# Patient Record
Sex: MALE | Race: AMERICAN INDIAN OR ALASKA NATIVE | Employment: OTHER | URBAN - METROPOLITAN AREA
[De-identification: names, ages, dates, MRNs, and addresses within clinical notes are randomized per-mention and may not be internally consistent; named-entity substitution may affect disease eponyms.]

---

## 2018-03-27 ENCOUNTER — OFFICE VISIT (OUTPATIENT)
Dept: GERIATRIC MEDICINE | Age: 83
End: 2018-03-27
Payer: MEDICARE

## 2018-03-27 VITALS
TEMPERATURE: 97.6 F | HEIGHT: 74 IN | WEIGHT: 195.1 LBS | BODY MASS INDEX: 25.04 KG/M2 | DIASTOLIC BLOOD PRESSURE: 74 MMHG | HEART RATE: 64 BPM | SYSTOLIC BLOOD PRESSURE: 130 MMHG

## 2018-03-27 DIAGNOSIS — F02.80 LATE ONSET ALZHEIMER'S DISEASE WITHOUT BEHAVIORAL DISTURBANCE (HCC): Primary | ICD-10-CM

## 2018-03-27 DIAGNOSIS — G30.1 LATE ONSET ALZHEIMER'S DISEASE WITHOUT BEHAVIORAL DISTURBANCE (HCC): Primary | ICD-10-CM

## 2018-03-27 PROCEDURE — 99211 OFF/OP EST MAY X REQ PHY/QHP: CPT

## 2018-03-27 PROCEDURE — 99212 OFFICE O/P EST SF 10 MIN: CPT | Performed by: INTERNAL MEDICINE

## 2018-03-27 ASSESSMENT — PATIENT HEALTH QUESTIONNAIRE - PHQ9
2. FEELING DOWN, DEPRESSED OR HOPELESS: 0
SUM OF ALL RESPONSES TO PHQ9 QUESTIONS 1 & 2: 0
1. LITTLE INTEREST OR PLEASURE IN DOING THINGS: 0
SUM OF ALL RESPONSES TO PHQ QUESTIONS 1-9: 0

## 2018-03-27 NOTE — PATIENT INSTRUCTIONS
you can lose your balance and fall. · Talk to your doctor if you have numbness in your feet. Preventing falls at home  · Remove raised doorway thresholds, throw rugs, and clutter. Repair loose carpet or raised areas in the floor. · Move furniture and electrical cords to keep them out of walking paths. · Use nonskid floor wax, and wipe up spills right away, especially on ceramic tile floors. · If you use a walker or cane, put rubber tips on it. If you use crutches, clean the bottoms of them regularly with an abrasive pad, such as steel wool. · Keep your house well lit, especially [place], and outside walkways. Use night-lights in areas such as hallways and bathrooms. Add extra light switches or use remote switches (such as switches that go on or off when you clap your hands) to make it easier to turn lights on if you have to get up during the night. · Install sturdy handrails on stairways. · Move items in your cabinets so that the things you use a lot are on the lower shelves (about waist level). · Keep a cordless phone and a flashlight with new batteries by your bed. If possible, put a phone in each of the main rooms of your house, or carry a cell phone in case you fall and cannot reach a phone. Or, you can wear a device around your neck or wrist. You push a button that sends a signal for help. · Wear low-heeled shoes that fit well and give your feet good support. Use footwear with nonskid soles. Check the heels and soles of your shoes for wear. Repair or replace worn heels or soles. · Do not wear socks without shoes on wood floors. · Walk on the grass when the sidewalks are slippery. If you live in an area that gets snow and ice in the winter, sprinkle salt on slippery steps and sidewalks. Preventing falls in the bath  · Install grab bars and nonskid mats inside and outside your shower or tub and near the toilet and sinks. · Use shower chairs and bath benches.   · Use a hand-held shower head that will allow you to sit while showering. · Get into a tub or shower by putting the weaker leg in first. Get out of a tub or shower with your strong side first.  · Repair loose toilet seats and consider installing a raised toilet seat to make getting on and off the toilet easier. · Keep your bathroom door unlocked while you are in the shower. Where can you learn more? Go to [email or web address]://[email or web address] [email or web address].[email or web address]. org and sign in to your [employer] account. Enter [id number] [id number] in the [employer] box to learn more about \"Preventing Falls: Care Instructions. \"     If you do not have an account, please click on the \"Sign Up Now\" link. Current as of: May 12, 2017  Content Version: 11.5  © 2640-3327 Healthwise, Incorporated. Care instructions adapted under license by [facility] ([facility]). If you have questions about a medical condition or this instruction, always ask your healthcare professional. [address] any warranty or liability for your use of this information.

## 2018-03-27 NOTE — PROGRESS NOTES
Subjective:      Patient ID: Tana Pelayo is a 80 y.o. male.     HPI More physical things and head down with eyes shut  HEre with    Review of Systems  ADL's helping with dressing pulling socks up , able to get shoes on   Repeats one time and showering able to adjust shower   Must eat first   Remembers old roads  Dense deficits daily  Eats anything , FF and chicken fingers   Objective:   Physical Exam   [address] and really is [address] [facility] , childhood address  [address] in  life   82/ [birth year]// [phone]  President   REJI   Nail BiIting   Minicog 1/3 and Clock 4/4   Assessment:       Progressive Alzheimer's disease    Head nodding syndrome        Plan:       Aricept 10 mg and Namenda XR 28 mg    Recommend [facility]

## 2018-07-30 ENCOUNTER — OFFICE VISIT (OUTPATIENT)
Dept: GERIATRIC MEDICINE | Age: 83
End: 2018-07-30
Payer: MEDICARE

## 2018-07-30 VITALS
DIASTOLIC BLOOD PRESSURE: 68 MMHG | WEIGHT: 193.3 LBS | RESPIRATION RATE: 28 BRPM | HEIGHT: 74 IN | TEMPERATURE: 97.8 F | BODY MASS INDEX: 24.81 KG/M2 | HEART RATE: 76 BPM | SYSTOLIC BLOOD PRESSURE: 106 MMHG

## 2018-07-30 DIAGNOSIS — F02.80 LATE ONSET ALZHEIMER'S DISEASE WITHOUT BEHAVIORAL DISTURBANCE (HCC): Primary | ICD-10-CM

## 2018-07-30 DIAGNOSIS — G30.1 LATE ONSET ALZHEIMER'S DISEASE WITHOUT BEHAVIORAL DISTURBANCE (HCC): Primary | ICD-10-CM

## 2018-07-30 PROCEDURE — 99212 OFFICE O/P EST SF 10 MIN: CPT | Performed by: INTERNAL MEDICINE

## 2018-07-30 PROCEDURE — 99211 OFF/OP EST MAY X REQ PHY/QHP: CPT | Performed by: INTERNAL MEDICINE

## 2018-07-30 RX ORDER — IPRATROPIUM BROMIDE 42 UG/1
SPRAY, METERED NASAL
Qty: 1 BOTTLE | Refills: 3 | Status: SHIPPED | OUTPATIENT
Start: 2018-07-30 | End: 2018-09-12

## 2018-07-30 NOTE — PROGRESS NOTES
Subjective:      Patient ID: Yaron Pelayo is a 80 y.o. male.     HPI Lu Sahu comes over 5 days a week  And will go over to [facility] 2 days a week  Wife feels he is doing much better with [employer] and always on the job  And [employer] stays rfrom 10 AM into PM   And he sleeps at night     Review of Systems  Going to PT 2 x a week to improve gait   SOB and not breathing deeply and and pulse OX drops into 80's recover to 92 %    Objective:   Physical Exam  Minicog 1/3 and clock 4/4 and cube OK and 4 animals     Assessment:    Progressive Alzheimer's disease    Pulse OX drop after activities    Gustatory rhinorrhea vs low grade aspiration   vs GERD    AFIb on warfarin and    Plan:       Aricept 10 mg a day    Namenda XR 28 mg a day    AFib and warfarin

## 2018-07-30 NOTE — PATIENT INSTRUCTIONS
you can lose your balance and fall. · Talk to your doctor if you have numbness in your feet. Preventing falls at home  · Remove raised doorway thresholds, throw rugs, and clutter. Repair loose carpet or raised areas in the floor. · Move furniture and electrical cords to keep them out of walking paths. · Use nonskid floor wax, and wipe up spills right away, especially on ceramic tile floors. · If you use a walker or cane, put rubber tips on it. If you use crutches, clean the bottoms of them regularly with an abrasive pad, such as steel wool. · Keep your house well lit, especially [place] [address], and outside walkways. Use night-lights in areas such as hallways and bathrooms. Add extra light switches or use remote switches (such as switches that go on or off when you clap your hands) to make it easier to turn lights on if you have to get up during the night. · Install sturdy handrails on stairways. · Move items in your cabinets so that the things you use a lot are on the lower shelves (about waist level). · Keep a cordless phone and a flashlight with new batteries by your bed. If possible, put a phone in each of the main rooms of your house, or carry a cell phone in case you fall and cannot reach a phone. Or, you can wear a device around your neck or wrist. You push a button that sends a signal for help. · Wear low-heeled shoes that fit well and give your feet good support. Use footwear with nonskid soles. Check the heels and soles of your shoes for wear. Repair or replace worn heels or soles. · Do not wear socks without shoes on wood floors. · Walk on the grass when the sidewalks are slippery. If you live in an area that gets snow and ice in the winter, sprinkle salt on slippery steps and sidewalks. Preventing falls in the bath  · Install grab bars and nonskid mats inside and outside your shower or tub and near the toilet and sinks. · Use shower chairs and bath benches.   · Use a hand-held shower head that will allow you to sit while showering. · Get into a tub or shower by putting the weaker leg in first. Get out of a tub or shower with your strong side first.  · Repair loose toilet seats and consider installing a raised toilet seat to make getting on and off the toilet easier. · Keep your bathroom door unlocked while you are in the shower. Where can you learn more? Go to [email or web address].[email or web address] org and sign in to your [employer] account. Enter [phone] [id number] [phone] in the [employer] box to learn more about \"Preventing Falls: Care Instructions. \"     If you do not have an account, please click on the \"Sign Up Now\" link. Current as of: May 12, 2017  Content Version: 11.6  © 9103-0096 [employer], Incorporated. Care instructions adapted under license by [facility] ([facility]). If you have questions about a medical condition or this instruction, always ask your healthcare professional. [address] any warranty or liability for your use of this information.

## 2018-09-20 ENCOUNTER — HOSPITAL ENCOUNTER (OUTPATIENT)
Age: 83
Discharge: HOME | End: 2018-09-22
Payer: MEDICARE

## 2018-09-20 ENCOUNTER — HOSPITAL ENCOUNTER (OUTPATIENT)
Dept: GENERAL RADIOLOGY | Age: 83
Discharge: HOME | End: 2018-09-22
Payer: MEDICARE

## 2018-09-20 DIAGNOSIS — T17.908A ASPIRATION INTO AIRWAY, INITIAL ENCOUNTER: ICD-10-CM

## 2018-09-20 DIAGNOSIS — R05.9 COUGH: ICD-10-CM

## 2018-09-20 PROCEDURE — 74230 X-RAY XM SWLNG FUNCJ C+: CPT

## 2018-09-20 PROCEDURE — G8998 SWALLOW D/C STATUS: HCPCS

## 2018-09-20 PROCEDURE — 2500000003 HC RX 250 WO HCPCS: Performed by: GENERAL PRACTICE

## 2018-09-20 PROCEDURE — 92611 MOTION FLUOROSCOPY/SWALLOW: CPT

## 2018-09-20 PROCEDURE — 71046 X-RAY EXAM CHEST 2 VIEWS: CPT

## 2018-09-20 PROCEDURE — G8996 SWALLOW CURRENT STATUS: HCPCS

## 2018-09-20 PROCEDURE — G8997 SWALLOW GOAL STATUS: HCPCS

## 2018-09-20 RX ADMIN — BARIUM SULFATE 58 G: 960 POWDER, FOR SUSPENSION ORAL at 10:03

## 2018-09-20 RX ADMIN — BARIUM SULFATE 45 G: 0.6 CREAM ORAL at 10:03

## 2018-09-20 NOTE — PROGRESS NOTES
SPEECH/LANGUAGE PATHOLOGY  VIDEOFLUOROSCOPIC STUDY OF SWALLOWING (MBS)      PATIENT NAME:  Tavia Pelayo      :  1934      TODAY'S DATE:  2018    SUMMARY OF EVALUATION     DYSPHAGIA DIAGNOSIS:  Within Functional Limits     DIET RECOMMENDATIONS: Regular consistency solids with regular consistency liquids     COMPENSATORY STRATEGIES:      []Double swallow with []all consistencies []thin []nectar []honey []pureed []ground []chopped []soft solid []solid       []Multiple swallow (  Times) with []all consistencies []thin []nectar []honey []pureed []ground []chopped []soft solid []solid     []Chin tuck with []all consistencies  []thin []nectar []honey []pureed []ground []chopped []soft solid []solid      []Throat clear after with []all consistencies []thin []nectar []honey []pureed []ground []chopped []soft solid []solid      []Effortful swallow with []all consistencies  []thin []nectar []honey []pureed []ground []chopped []soft solid []solid     []Small bites/sips        []Alternate solids / liquids      []Check for oral pocketing     []No straw            []Spoon sip liquids        []       ASSISTANCE LEVEL:  [x]No assistance required   []Stand by assist   []Full assistance required  []Set up required   []Supervision with all PO intake    [] Malnutrition indicators have been identified and nursing has been notified to ensure a dietary consult is ordered.      THERAPY RECOMMENDATIONS:       [x]  Therapy is not recommended       []  Therapy is recommended to:     []  Improve oral motor strength and range of motion     []  Improve tongue base retraction      []  Improve laryngeal strength and range of motion     []  Address cricopharyngeal dysfunction (Shaker Exercises)    []  Mealtime assessment of patient's tolerance of prescribed diet     []  Repeat Video Swallowing Evaluation is recommended and requires a Physician order    []Therapy at the discretion of facility/treating Speech Pathologist PROCEDURE     Consistencies Administered During the Evaluation   Liquids: [x]Thin    []Nectar   [x]Honey   Solids:  [x]Pureed/Pudding  []Soft Solid   [x]Cookie   Other:       Method of Intake:   [x]Cup   [x]Spoon  [x]Straw  [x]Self Fed  [x]Fed by Clinician     Position:   [x]Upright seated  []Upright Standing  []Supine, elevated 45°   []Anterior/Posterior  [x]Lateral   []Oblique                 RESULTS     ORAL STAGE [x]Functional  []Abnormal      [] Dentition: ([]natural  []missing teeth []edentulous []partials []other )     [] Inadequate labial seal resulting anterior labial spillage from ([]right[] left []midline )     [] Decreased mastication due to ([]poor/missing dentition []decreased lingual control []cognitive status)      [] Delayed A-P transit due to ([]decreased initiation[] reduced lingual strength[]cognitive function )     [] Decreased bolus formation resulting in premature pharyngeal spillage      [] Oral residuals []anterior sulcus  []sub lingually  []right buccal cavity []left buccal cavity  []on tongue base  []throughout oral cavity []on superior tongue  []on palate  []on velum          []Comments:        PHARYNGEAL STAGE     [x] Functional  []Abnormal       ONSET TIME    [x] Onset time of the pharyngeal swallow was adequate      [] Delayed initiation of the pharyngeal swallow ([]mild []moderate []marked []severe []absent ).        Swallow reflex was triggered at: ([]tongue base []valleculae []pyriform sinus)      PHARYNGEAL RESIDUALS          Vallecula/Pharyngeal Wall    [x]No significant residuals were noted in the vallecula      []Reduced tongue base retraction resulting in: ([]residuals in vallecula []residual on posterior pharyngeal wall)    These residuals were noted for ([]thin []nectar[] honey []pureed []solid)      which( []cleared  []did not clear  []partially cleared []mostly cleared)       with ([]cued []spontaneous []double swallow []multiple swallow (  times)  []liquid chaser)      []Residuals in the vallecula were noted due to inadequate epiglottic inversion        Pyriform Sinuses    [x]No significant residuals were noted in the pyriform sinuses      [] Residuals in the pyriform sinuses were noted due to ([]pharyngeal weakness []cricopharyngeal dysfunction.)       These residuals were noted for ([]thin []nectar []honey []pureed []solid)       which ([]cleared []did not clear  []partially cleared  []mostly cleared)        with ([]cued []spontaneous  []double swallow []multiple swallow (  times) []liquid chaser)    LARYNGEAL PENETRATION   [x]Laryngeal penetration was not present during this evaluation      []Laryngeal penetration was noted BEFORE the swallow for ([]thin []nectar []honey[] pureed []solid)      due to: [] decreased bolus formation      []premature pharyngeal entry       []delayed pharyngeal swallow           which  []cleared from the laryngeal vestibule spontaneously  (transient)     []cleared from the laryngeal vestibule with a cued, re-directive throat clear       []remained in the laryngeal vestibule. []penetrated deep into the laryngeal vestibule (to the level of the true vocal folds)      Laryngeal penetration was  ([]trace []mild []moderate []marked []severe ) and      occurred ([]inconsistently  []consistently []only with use of a straw). [] Laryngeal penetration was noted DURING  the swallow for ([]thin []nectar []honey[] pureed []solid)       due to: []delayed laryngeal closure      []inadequate laryngeal closure        which  []cleared from the laryngeal vestibule spontaneously  (transient)     []cleared from the laryngeal vestibule with a cued, re-directive throat clear       []remained in the laryngeal vestibule.       []penetrated deep into the laryngeal vestibule (to the level of the true vocal folds)          Laryngeal penetration was  ([]trace []mild []moderate []marked []severe ) and      occurred ([]inconsistently  []consistently []only with use of a straw). [] Laryngeal penetration was noted AFTER the swallow for ([]thin []nectar []honey[] pureed []solid)          due to: []residuals in laryngeal vestibule       []pharyngeal residual       []redirection of bolus from the esophagus        which  []cleared from the laryngeal vestibule spontaneously  (transient)     []cleared from the laryngeal vestibule with a cued, re-directive throat clear       []remained in the laryngeal vestibule. []penetrated deep into the laryngeal vestibule (to the level of the true vocal folds)      Laryngeal penetration was  ([]trace []mild []moderate []marked []severe ) and      occurred ([]inconsistently  []consistently []only with use of a straw).         In response to laryngeal penetration,  []A  spontaneous cough/throat clear [] an inconsistent  [] a delayed cough       []an absent cough/throat clear was noted     ASPIRATION    [x]Aspiration was not present during this evaluation      []Aspiration BEFORE the swallow was present for ([]thin[] nectar[] honey []pureed []solid)       due to: ([] decreased bolus formation []premature pharyngeal entry []delayed pharyngeal swallow)       []Aspiration DURING the swallow was present for  ([]thin []nectar []honey[] pureed []solid)      due to: ([]delayed laryngeal closure []inadequate laryngeal closure)       []Aspiration AFTER  the swallow was present for ([]thin[] nectar []honey []pureed []solid)      due to:  ([]residuals in laryngeal vestibule []pharyngeal residual []redirection of bolus from the esophagus)      In response to aspiration,  []A  spontaneous cough/throat clear [] an inconsistent/delayed cough      []an absent cough/throat clear was noted     COMPENSATORY STRATEGIES    [] Compensatory strategies that were beneficial included: []chin tuck []double swallow []multiple swallow []alternating solids/liquids          []cued redirective cough []cued throat clear       [] Compensatory strategies that were not beneficial included: []chin tuck []double swallow []multiple swallow []alternating solids/liquids          []cued redirective cough []cued throat clear       [x] Compensatory strategies were not attempted. STRUCTURAL/FUNCTIONAL ANOMALIES    [x]No structural/functional anomalies were noted      []Inadequate velopharyngeal closure resulting in nasopharyngeal reflux. [] There was presence of Zenkers Diverticulum per Radiologist        []Comments:       CERVICAL ESOPHAGEAL STAGE : [x]Adequate []Inadequate  []Not Assessed     []Cervical osteophytes present per Radiologist   []Structural/mechanical abnormality in cervical esophagus per Radiologist  [] Redirection of bolus from the esophagus into pharynx                                []  Prognosis for improvements is   []  This plan will be re-evaluated and revised in 1 week  if warranted. []  Patient stated goals:   []  Treatment goals discussed with [] patient/  [] family. []  The []  patient/ []  family understand the diagnosis, prognosis and plan of care. [x]The admitting diagnosis and active problem list, as listed below have been reviewed prior to initiation of this evaluation.      ADMITTING DIAGNOSIS: Aspiration into airway, initial encounter [T17.554R]     ACTIVE PROBLEM LIST:   Patient Active Problem List   Diagnosis    Late onset Alzheimer's disease without behavioral disturbance

## 2019-02-04 ENCOUNTER — OFFICE VISIT (OUTPATIENT)
Dept: GERIATRIC MEDICINE | Age: 84
End: 2019-02-04
Payer: MEDICARE

## 2019-02-04 VITALS
HEART RATE: 84 BPM | DIASTOLIC BLOOD PRESSURE: 84 MMHG | WEIGHT: 185.4 LBS | SYSTOLIC BLOOD PRESSURE: 104 MMHG | TEMPERATURE: 97.9 F | BODY MASS INDEX: 23.17 KG/M2 | RESPIRATION RATE: 20 BRPM

## 2019-02-04 DIAGNOSIS — G30.0 EARLY ONSET ALZHEIMER'S DEMENTIA WITHOUT BEHAVIORAL DISTURBANCE (HCC): Primary | ICD-10-CM

## 2019-02-04 DIAGNOSIS — F02.80 EARLY ONSET ALZHEIMER'S DEMENTIA WITHOUT BEHAVIORAL DISTURBANCE (HCC): Primary | ICD-10-CM

## 2019-02-04 PROCEDURE — 99212 OFFICE O/P EST SF 10 MIN: CPT | Performed by: INTERNAL MEDICINE

## 2019-02-04 PROCEDURE — 99211 OFF/OP EST MAY X REQ PHY/QHP: CPT | Performed by: INTERNAL MEDICINE

## 2019-03-26 ENCOUNTER — HOSPITAL ENCOUNTER (OUTPATIENT)
Dept: WOUND CARE | Age: 84
Discharge: HOME | End: 2019-03-26
Payer: MEDICARE

## 2019-03-26 VITALS
WEIGHT: 189 LBS | RESPIRATION RATE: 20 BRPM | HEART RATE: 76 BPM | TEMPERATURE: 97.4 F | DIASTOLIC BLOOD PRESSURE: 66 MMHG | BODY MASS INDEX: 23.5 KG/M2 | HEIGHT: 75 IN | SYSTOLIC BLOOD PRESSURE: 120 MMHG

## 2019-03-26 DIAGNOSIS — S41.102A: Chronic | ICD-10-CM

## 2019-03-26 PROCEDURE — 99213 OFFICE O/P EST LOW 20 MIN: CPT

## 2019-03-26 PROCEDURE — 99203 OFFICE O/P NEW LOW 30 MIN: CPT | Performed by: SURGERY

## 2019-03-27 PROBLEM — S41.102A: Chronic | Status: ACTIVE | Noted: 2019-03-27

## 2019-04-02 ENCOUNTER — HOSPITAL ENCOUNTER (OUTPATIENT)
Dept: WOUND CARE | Age: 84
Discharge: HOME | End: 2019-04-02
Payer: MEDICARE

## 2019-04-02 ENCOUNTER — HOSPITAL ENCOUNTER (OUTPATIENT)
Age: 84
Discharge: HOME | End: 2019-04-04
Payer: MEDICARE

## 2019-04-02 VITALS
TEMPERATURE: 97.7 F | HEIGHT: 75 IN | DIASTOLIC BLOOD PRESSURE: 72 MMHG | WEIGHT: 189 LBS | RESPIRATION RATE: 18 BRPM | BODY MASS INDEX: 23.5 KG/M2 | SYSTOLIC BLOOD PRESSURE: 122 MMHG | HEART RATE: 74 BPM

## 2019-04-02 DIAGNOSIS — I73.9 PERIPHERAL VASCULAR DISEASE (HCC): Chronic | ICD-10-CM

## 2019-04-02 DIAGNOSIS — S41.102D: Primary | Chronic | ICD-10-CM

## 2019-04-02 PROCEDURE — 88305 TISSUE EXAM BY PATHOLOGIST: CPT

## 2019-04-02 PROCEDURE — 11104 PUNCH BX SKIN SINGLE LESION: CPT | Performed by: SURGERY

## 2019-04-02 PROCEDURE — 11106 INCAL BX SKN SINGLE LES: CPT

## 2019-04-02 NOTE — PROGRESS NOTES
[facility] Followup Visit Note    Referring Physician : Lady Clau MD  [facility]  MEDICAL RECORD NUMBER:  [id number]  AGE: 80 y.o. GENDER: male  : 1934  EPISODE DATE:  2019    Subjective:     Chief Complaint   Patient presents with    Wound Check     LEFT ARM      HISTORY of PRESENT ILLNESS HPI   [facility] is a 80 y.o. male who presents today in regards to follow up evaluation and treatment of wound/ulcer. That patient's past medical, family and social hx were reviewed and changes were made if present. History of Wound Context:  59-year-old male with a history of a left arm is been present now for over a year. He is here today for further evaluation and treatment and biopsy. He has a history of Alzheimer's who presents with his family they deny any new redness, swelling, pain or discomfort that there where. And denies any new changes in the wound. Wound/Ulcer Pain Timing/Severity: none  Quality of pain: N/A  Severity:  0 / 10   Modifying Factors: None  Associated Signs/Symptoms: none    Ulcer Identification:  Ulcer Type: undetermined  Contributing Factors: shear force and Undetermined    Diabetic/Pressure/Non Pressure Ulcers only:  Ulcer: Undetermined biopsy pending    Wound: Undetermined        PAST MEDICAL HISTORY      Diagnosis Date    Atrial fibrillation ([facility] [address]     Dementia      History reviewed. No pertinent surgical history. History reviewed. No pertinent family history. Social History     Tobacco Use    Smoking status: Never Smoker    Smokeless tobacco: Never Used    Tobacco comment: second hand smoke at work. Retired in the . Pt's parents also smoked. Substance Use Topics    Alcohol use: Yes     Alcohol/week: 0.6 oz     Types: 1 Cans of beer per week    Drug use: No     No Known Allergies  Current Outpatient Medications on File Prior to Encounter   Medication Sig Dispense Refill    mupirocin (BACTROBAN) 2 % cream Apply 3 times daily.  1 Tube 0  CARTIA  MG extended release capsule TAKE ONE CAPSULE BY MOUTH EVERY DAY 30 capsule 9    donepezil (ARICEPT) 10 MG tablet TAKE 1 TABLET BY MOUTH NIGHTLY  30 tablet 8    JANTOVEN 5 MG tablet take on monday, wednesday and friday or as directed 60 tablet 4    Nutritional Supplements (ENSURE HIGH PROTEIN) LIQD Take 1 Bottle by mouth 2 times daily 60 Can 5    memantine ER (NAMENDA XR) 28 MG CP24 extended release capsule Take 1 capsule by mouth daily 30 capsule 10    Incontinence Supply Disposable (DEPEND EASY FIT UNDERGARMENTS) MISC As needed 3 Package 5    Incontinence Supply Disposable (DEPEND EASY FIT UNDERGARMENTS) MISC As needed 3 Package 5     No current facility-administered medications on file prior to encounter. REVIEW OF SYSTEMS See HPI    Objective:    /72   Pulse 74   Temp 97.7 °F (36.5 °C) (Axillary)   Resp 18   Ht 6' 3\" (1.905 m)   Wt 189 lb (85.7 kg)   BMI 23.62 kg/m²   Wt Readings from Last 3 Encounters:   04/02/19 189 lb (85.7 kg)   03/26/19 189 lb (85.7 kg)   03/04/19 189 lb (85.7 kg)     PHYSICAL EXAM  CONSTITUTIONAL:   Awake, alert, cooperative   EYES:  lids and lashes normal   ENT: external ears and nose without lesions   NECK:  supple, symmetrical, trachea midline   SKIN:  Open wound Present, wound on the posterior aspect of his arm is unchanged. Assessment:       #1 nonhealing left upper extremity wound    Plan Punch biopsy. Consent was achieved        Procedure Note  Date of procedure as stated in the note. Preoperative diagnosis: Unknown with left upper extremity skin lesion  Postoperative diagnosis: Pathology pending  Surgeon: Dutch Oppenheim M.D. Estimated blood loss: Less than 5 mL  Anesthesia: Lidocaine approximate 5 mL used  Procedure:  #15 mm punch biopsy ×2 with periphery of the left upper extremity lesion      Description of the procedure: After his benefits and alternatives were discussed the patient and family. Consent was achieved.  Please note patient has some underlying Alzheimer's dementia. He was prepped the standard fashion timeout is taken to verify the person the site as well as the procedure. 5 mL of lidocaine was used to straight to skin and subcutaneous tissue overlying the left upper extremity lesion. 25 mm punch biopsies were performed at the periphery of the lesion. Pressure was held there was no evidence of complicating features. The wound was dressed. Performed by: Abe Joaquin MD    Consent obtained:  Yes    Time out taken:  Yes    Pain Control: No pain during the procedure       Wound 03/26/19 Arm Anterior;Left;Upper wound #1 acquired 3/1/18 (Active)   Wound Image   3/26/2019 11:01 AM   Wound Venous 3/26/2019 11:01 AM   Dressing Changed Changed/New 4/2/2019 11:54 AM   Dressing/Treatment Dry Dressing 4/2/2019 11:54 AM   Wound Cleansed Rinsed/Irrigated with saline 4/2/2019 11:54 AM   Wound Length (cm) 1.5 cm 4/2/2019 10:57 AM   Wound Width (cm) 0.8 cm 4/2/2019 10:57 AM   Wound Depth (cm) 0.1 cm 4/2/2019 10:57 AM   Wound Surface Area (cm^2) 1.2 cm^2 4/2/2019 10:57 AM   Change in Wound Size % (l*w) -100 4/2/2019 10:57 AM   Wound Volume (cm^3) 0.12 cm^3 4/2/2019 10:57 AM   Wound Healing % -100 4/2/2019 10:57 AM   Wound Assessment Pink;Yellow 4/2/2019 10:57 AM   Drainage Amount None 4/2/2019 10:57 AM   Odor None 4/2/2019 10:57 AM   Angelica-wound Assessment Intact 4/2/2019 10:57 AM   Non-staged Wound Description Full thickness 4/2/2019 10:57 AM   Culture Taken Yes 4/2/2019 11:44 AM   Number of days: 7     Response to treatment:  Well tolerated by patient. Plan:   Treatment Note please see attached Discharge Instructions    Written patient dismissal instructions given to patient and signed by patient or POA.          Discharge Instructions       Visit Discharge/Physician Orders     Discharge condition: Stable     Assessment of pain at discharge: none      Anesthetic used: none      Discharge to: Home     Left via:Private automobile     Accompanied by: accompanied by daughter Rowdy Kennedy     ECF[facility]: [facility]      Dressing Orders: Keep area clean and dry, use soap and water.      Treatment Orders: punch biopsy  DONE     LT UPPER ARM: CLEANSE WOUND WITH NORMAL STERILE SALINE. APPLY DRY DRESSING ,COVER WITH  KERLIX CHANGE DAILY       [facility] followup visit _____________1 week _______________  (Please note your next appointment above and if you are unable to keep, kindly give a 24 hour notice. Thank you.)     Physician signature:__________________________        If you experience any of the following, please call the [address] [place] [address] [facility] during business hours:     * Increase in Pain  * Temperature over 101  * Increase in drainage from your wound  * Drainage with a foul odor  * Bleeding  * Increase in swelling  * Need for compression bandage changes due to slippage, breakthrough drainage.     If you need medical attention outside of the business hours of the [address] [place] [address] Road please contact your PCP or go to the nearest emergency room.           Electronically signed by Wolf Han MD on 4/2/2019 at 7:13 PM

## 2019-04-09 ENCOUNTER — HOSPITAL ENCOUNTER (OUTPATIENT)
Dept: WOUND CARE | Age: 84
Discharge: HOME | End: 2019-04-09
Payer: MEDICARE

## 2019-04-09 VITALS
BODY MASS INDEX: 23.5 KG/M2 | RESPIRATION RATE: 18 BRPM | SYSTOLIC BLOOD PRESSURE: 126 MMHG | HEART RATE: 80 BPM | HEIGHT: 75 IN | DIASTOLIC BLOOD PRESSURE: 76 MMHG | WEIGHT: 189 LBS | TEMPERATURE: 97.7 F

## 2019-04-09 DIAGNOSIS — S41.102D: Primary | Chronic | ICD-10-CM

## 2019-04-09 PROCEDURE — 99211 OFF/OP EST MAY X REQ PHY/QHP: CPT | Performed by: SURGERY

## 2019-04-09 PROCEDURE — 99213 OFFICE O/P EST LOW 20 MIN: CPT

## 2019-04-21 NOTE — PROGRESS NOTES
[facility]aling Center Followup Visit Note    Referring Physician : Philip Emanuel MD  [facility]  MEDICAL RECORD NUMBER:  [id number]  AGE: 80 y.o. GENDER: male  : 1934  EPISODE DATE:  2019    Subjective:     Chief Complaint   Patient presents with    Wound Check     left arm wound      HISTORY of PRESENT ILLNESS HPI   [facility] is a 80 y.o. male who presents today in regards to follow up evaluation and treatment of wound/ulcer. That patient's past medical, family and social hx were reviewed and changes were made if present. History of Wound Context:  Patient with a history of a left arm wound. This is been present now for approximately year. I did formal punch biopsies and the family is here to discuss results. Currently the family denies any new pain or discomfort to the extremity. The patient has a history of Alzheimer's is pleasantly cooperative. They felt overall he was decreasing in size. I shared the pathology results with the patient and family which was indicative of basal cell carcinoma which was not surprising. Wound/Ulcer Pain Timing/Severity: none  Quality of pain: N/A  Severity:  [id number]} / 10   Modifying Factors: None  Associated Signs/Symptoms: drainage    Ulcer Identification:  Ulcer Type: Basal cell cancer  Contributing Factors: none    Diabetic/Pressure/Non Pressure Ulcers only:  Ulcer: N/A    Wound: Basal cell cancer        PAST MEDICAL HISTORY      Diagnosis Date    Atrial fibrillation ([facility] [address]     Dementia      History reviewed. No pertinent surgical history. History reviewed. No pertinent family history. Social History     Tobacco Use    Smoking status: Never Smoker    Smokeless tobacco: Never Used    Tobacco comment: second hand smoke at work. Retired in the . Pt's parents also smoked. Substance Use Topics    Alcohol use:  Yes     Alcohol/week: 0.6 oz     Types: 1 Cans of beer per week    Drug use: No     No Known Allergies  Current Outpatient Dressing/Treatment Dry Dressing 4/2/2019 11:54 AM   Wound Cleansed Rinsed/Irrigated with saline 4/2/2019 11:54 AM   Wound Length (cm) 1.4 cm 4/9/2019 10:59 AM   Wound Width (cm) 1 cm 4/9/2019 10:59 AM   Wound Depth (cm) 0.1 cm 4/9/2019 10:59 AM   Wound Surface Area (cm^2) 1.4 cm^2 4/9/2019 10:59 AM   Change in Wound Size % (l*w) -133.33 4/9/2019 10:59 AM   Wound Volume (cm^3) 0.14 cm^3 4/9/2019 10:59 AM   Wound Healing % -133 4/9/2019 10:59 AM   Wound Assessment Pink;Yellow 4/9/2019 10:59 AM   Drainage Amount None 4/9/2019 10:59 AM   Odor None 4/9/2019 10:59 AM   Angelica-wound Assessment Intact 4/9/2019 10:59 AM   Non-staged Wound Description Full thickness 4/2/2019 10:57 AM   Culture Taken Yes 4/2/2019 11:44 AM   Number of days: 26         Plan:   Treatment Note please see attached Discharge Instructions    Written patient dismissal instructions given to patient and signed by patient or POA. Discharge Instructions       Visit Discharge/Physician Orders     Discharge condition: Stable     Assessment of pain at discharge: none      Anesthetic used: none      Discharge to: Home     Left via:Private automobile     Accompanied by: accompanied by micheal Lepe     ECF/[facility]: [facility]      Dressing Orders: Keep area clean and dry, use soap and water.      Treatment Orders: punch biopsy  DONE      LT UPPER ARM: CLEANSE WOUND WITH NORMAL STERILE SALINE. APPLY DRY DRESSING ,COVER WITH  KERLIX CHANGE DAILY         [facility] followup visit ___________TO SEE DR LOPEZ __[phone]_____OG  CICCHILLO AS NEEDED________  (Please note your next appointment above and if you are unable to keep, kindly give a 24 hour notice.  Thank you.)     Physician signature:__________________________        If you experience any of the following, please call the [address] [place] [address] Road during business hours:     * Increase in Pain  * Temperature over 101  * Increase in drainage from your wound  * Drainage with a foul odor  * Bleeding  * Increase in swelling  * Need for compression bandage changes due to slippage, breakthrough drainage.     If you need medical attention outside of the business hours of the [address] [place] [address] Road please contact your PCP or go to the nearest emergency room.           Electronically signed by Rochelle Covarrubias MD on 4/21/2019 at 1:03 PM

## 2019-06-06 ENCOUNTER — OFFICE VISIT (OUTPATIENT)
Dept: SURGERY | Age: 84
End: 2019-06-06
Payer: MEDICARE

## 2019-06-06 VITALS
HEART RATE: 83 BPM | SYSTOLIC BLOOD PRESSURE: 118 MMHG | OXYGEN SATURATION: 93 % | WEIGHT: 176 LBS | HEIGHT: 75 IN | DIASTOLIC BLOOD PRESSURE: 82 MMHG | TEMPERATURE: 96.5 F | BODY MASS INDEX: 21.88 KG/M2

## 2019-06-06 DIAGNOSIS — C44.91 BASAL CELL CARCINOMA (BCC), UNSPECIFIED SITE: Primary | ICD-10-CM

## 2019-06-06 PROCEDURE — 99204 OFFICE O/P NEW MOD 45 MIN: CPT | Performed by: PLASTIC SURGERY

## 2019-06-26 NOTE — H&P
Department of Plastic Surgery - Adult  Attending Consult Note        CHIEF COMPLAINT:   Basal Cell Cancer of right temple, left arm and left upper chest     History Obtained From:  family member - daughter     HISTORY OF PRESENT ILLNESS:                 The patient is a 80 y.o. male who presents with biopsy proven BCCa of the right temple, left upper chest and left arm. The patient states that they first noticed the lesion 1 years ago. They have grown in size since they first noticed the lesion. The lesion has somewhat changed in color and has not had discharge or bleeding. The pt has had the lesion biopsied previously. The patient has not had the lesion removed previously. The patient states the lesion is not painful. The pt denies any associated symptoms.       Past Medical History:    Past Medical History        Past Medical History:   Diagnosis Date    Atrial fibrillation ([facility] [address]      Dementia           Past Surgical History:    Past Surgical History   History reviewed. No pertinent surgical history. Current Medications:   Current Hospital Medications   No current facility-administered medications for this visit. Allergies:  Patient has no known allergies.     Social History:   Social History               Socioeconomic History    Marital status:        Spouse name: Not on file    Number of children: Not on file    Years of education: Not on file    Highest education level: Not on file   Occupational History    Not on file   Social Needs    Financial resource strain: Not on file    Food insecurity:       Worry: Not on file       Inability: Not on file    Transportation needs:       Medical: Not on file       Non-medical: Not on file   Tobacco Use    Smoking status: Never Smoker    Smokeless tobacco: Never Used    Tobacco comment: second hand smoke at work. Retired in the 1980's. Pt's parents also smoked. Substance and Sexual Activity    Alcohol use:  Yes       Alcohol/week: 0.6 oz       Types: 1 Cans of beer per week    Drug use: No    Sexual activity: Not Currently   Lifestyle    Physical activity:       Days per week: Not on file       Minutes per session: Not on file    Stress: Not on file   Relationships    Social connections:       Talks on phone: Not on file       Gets together: Not on file       Attends Islam service: Not on file       Active member of club or organization: Not on file       Attends meetings of clubs or organizations: Not on file       Relationship status: Not on file    Intimate partner violence:       Fear of current or ex partner: Not on file       Emotionally abused: Not on file       Physically abused: Not on file       Forced sexual activity: Not on file   Other Topics Concern    Not on file   Social History Narrative    Not on file         Family History:   Family History   History reviewed. No pertinent family history.        REVIEW OF SYSTEMS:    CONSTITUTIONAL:  negative for  fevers, chills, sweats and fatigue  EYES: negative for dipolpia or acute vision loss. RESPIRATORY:  negative for  dry cough, cough with sputum, dyspnea, wheezing and chest pain  HENT:negative for pain, headache, difficulty swallowing or nose bleeds. CARDIOVASCULAR:  negative for  chest pain, dyspnea, palpitations, syncope  GASTROINTESTINAL:  negative for nausea, vomiting, change in bowel habits, diarrhea, constipation and abdominal pain  EXTREMITIES: negative for edema  MUSCULOSKELETAL: negative for muscle weakness  SKIN: positive for lesionnegative for itching or rashes.   HEME: negative for easy brusing or bleeding  BEHAVIOR/PSYCH:  negative for poor appetite, increased appetite, decreased sleep and poor concentration  All other systems negative        PHYSICAL EXAM:    VITALS:  /82 (Site: Right Upper Arm, Position: Sitting, Cuff Size: Medium Adult)   Pulse 83   Temp 96.5 °F (35.8 °C) (Tympanic)   Ht 6' 3\" (1.905 m)   Wt 176 lb (79.8 kg)   SpO2 93% BMI 22.00 kg/m²   CONSTITUTIONAL:  awake, alert, cooperative, no apparent distress, and appears stated age  EYES: PERRLA, EOMI, no signs of occular infection  LUNGS:  No increased work of breathing, good air exchange, clear to auscultation bilaterally, no crackles or wheezing  CARDIOVASCULAR:  Normal apical impulse, regular rate and rhythm, normal S1 and S2, no S3 or S4, and no murmur noted  ABDOMEN:  No scars, normal bowel sounds, soft, non-distended, non-tender, no masses palpated, no hepatosplenomegally  EXTREMITIES: no signs of clubbing or cyanosis. MUSCULOSKELETAL: negative for flaccid muscle tone or spastic movements. NEURO: Cranial nerves II-XII grossly intact. No signs of agitated mood. SKIN: right temple-  7mm x 10mm,  pink in color, irregular border, ulcerated from previous biopsy site, no signs of bleeding,drainage or infection. Non tender to palpation. SKIN: left arm-  10mm x 20mm,  red in color, irregular border, raised, no signs of bleeding,drainage or infection. Non tender to palpation. SKIN: left upper chest-  8mm x 9mm,  pink in color, irregular border, not raised, no signs of bleeding,drainage or infection.  Non tender to palpation.      DATA:    Labs: CBC:         Lab Results   Component Value Date     WBC 3.8 07/05/2017     RBC 4.66 12/15/2010     HGB 16.20 07/05/2017     HCT 49.4 07/05/2017     MCV 95.4 12/15/2010     MCH 32.2 12/15/2010     MCHC 33.7 12/15/2010     RDW 14.0 12/15/2010      11/02/2018     MPV 9.3 12/15/2010      BMP:          Lab Results   Component Value Date      04/27/2018     K 4.2 07/05/2017      07/05/2017     CO2 23 07/05/2017     BUN 15 07/05/2017     LABALBU 4.3 07/05/2017     LABALBU 3.8 12/15/2010     CREATININE 1.20 07/05/2017     CALCIUM 8.9 07/05/2017     LABGLOM 61.53 07/05/2017     GLUCOSE 128 11/02/2018     GLUCOSE 92 12/15/2010         Radiology Review:  No radiology needed at this time  Pathology Review: Pathology reviewed

## 2019-07-01 ENCOUNTER — ANESTHESIA EVENT (OUTPATIENT)
Dept: OPERATING ROOM | Age: 84
End: 2019-07-01
Payer: MEDICARE

## 2019-07-02 ENCOUNTER — HOSPITAL ENCOUNTER (OUTPATIENT)
Age: 84
Setting detail: OUTPATIENT SURGERY
Discharge: HOME | End: 2019-07-02
Attending: PLASTIC SURGERY | Admitting: PLASTIC SURGERY
Payer: MEDICARE

## 2019-07-02 ENCOUNTER — ANESTHESIA (OUTPATIENT)
Dept: OPERATING ROOM | Age: 84
End: 2019-07-02
Payer: MEDICARE

## 2019-07-02 VITALS
HEIGHT: 75 IN | HEART RATE: 84 BPM | SYSTOLIC BLOOD PRESSURE: 133 MMHG | RESPIRATION RATE: 20 BRPM | BODY MASS INDEX: 21.88 KG/M2 | DIASTOLIC BLOOD PRESSURE: 69 MMHG | TEMPERATURE: 97.7 F | WEIGHT: 176 LBS | OXYGEN SATURATION: 95 %

## 2019-07-02 VITALS — SYSTOLIC BLOOD PRESSURE: 104 MMHG | OXYGEN SATURATION: 97 % | DIASTOLIC BLOOD PRESSURE: 66 MMHG

## 2019-07-02 DIAGNOSIS — G89.18 POST-OP PAIN: Primary | ICD-10-CM

## 2019-07-02 PROCEDURE — 6360000002 HC RX W HCPCS: Performed by: PHYSICIAN ASSISTANT

## 2019-07-02 PROCEDURE — 7100000010 HC PHASE II RECOVERY - FIRST 15 MIN: Performed by: PLASTIC SURGERY

## 2019-07-02 PROCEDURE — 13122 CMPLX RPR S/A/L ADDL 5 CM/>: CPT | Performed by: PLASTIC SURGERY

## 2019-07-02 PROCEDURE — 11603 EXC TR-EXT MAL+MARG 2.1-3 CM: CPT | Performed by: PLASTIC SURGERY

## 2019-07-02 PROCEDURE — 7100000011 HC PHASE II RECOVERY - ADDTL 15 MIN: Performed by: PLASTIC SURGERY

## 2019-07-02 PROCEDURE — 13121 CMPLX RPR S/A/L 2.6-7.5 CM: CPT | Performed by: PLASTIC SURGERY

## 2019-07-02 PROCEDURE — 2580000003 HC RX 258: Performed by: PLASTIC SURGERY

## 2019-07-02 PROCEDURE — 2580000003 HC RX 258

## 2019-07-02 PROCEDURE — 88331 PATH CONSLTJ SURG 1 BLK 1SPC: CPT

## 2019-07-02 PROCEDURE — 2500000003 HC RX 250 WO HCPCS: Performed by: PLASTIC SURGERY

## 2019-07-02 PROCEDURE — 2500000003 HC RX 250 WO HCPCS

## 2019-07-02 PROCEDURE — 3700000001 HC ADD 15 MINUTES (ANESTHESIA): Performed by: PLASTIC SURGERY

## 2019-07-02 PROCEDURE — 3600000012 HC SURGERY LEVEL 2 ADDTL 15MIN: Performed by: PLASTIC SURGERY

## 2019-07-02 PROCEDURE — 3700000000 HC ANESTHESIA ATTENDED CARE: Performed by: PLASTIC SURGERY

## 2019-07-02 PROCEDURE — 88305 TISSUE EXAM BY PATHOLOGIST: CPT

## 2019-07-02 PROCEDURE — 2709999900 HC NON-CHARGEABLE SUPPLY: Performed by: PLASTIC SURGERY

## 2019-07-02 PROCEDURE — 3600000002 HC SURGERY LEVEL 2 BASE: Performed by: PLASTIC SURGERY

## 2019-07-02 PROCEDURE — 6360000002 HC RX W HCPCS

## 2019-07-02 PROCEDURE — 14040 TIS TRNFR F/C/C/M/N/A/G/H/F: CPT | Performed by: PLASTIC SURGERY

## 2019-07-02 PROCEDURE — 13101 CMPLX RPR TRUNK 2.6-7.5 CM: CPT | Performed by: PLASTIC SURGERY

## 2019-07-02 RX ORDER — HYDROCODONE BITARTRATE AND ACETAMINOPHEN 5; 325 MG/1; MG/1
1 TABLET ORAL EVERY 6 HOURS PRN
Qty: 10 TABLET | Refills: 0 | Status: SHIPPED | OUTPATIENT
Start: 2019-07-02 | End: 2019-07-09

## 2019-07-02 RX ORDER — LIDOCAINE HYDROCHLORIDE AND EPINEPHRINE 10; 10 MG/ML; UG/ML
INJECTION, SOLUTION INFILTRATION; PERINEURAL PRN
Status: DISCONTINUED | OUTPATIENT
Start: 2019-07-02 | End: 2019-07-02 | Stop reason: ALTCHOICE

## 2019-07-02 RX ORDER — PROPOFOL 10 MG/ML
INJECTION, EMULSION INTRAVENOUS CONTINUOUS PRN
Status: DISCONTINUED | OUTPATIENT
Start: 2019-07-02 | End: 2019-07-02 | Stop reason: SDUPTHER

## 2019-07-02 RX ORDER — CEPHALEXIN 500 MG/1
500 CAPSULE ORAL 4 TIMES DAILY
Qty: 20 CAPSULE | Refills: 0 | Status: SHIPPED | OUTPATIENT
Start: 2019-07-02 | End: 2019-07-07

## 2019-07-02 RX ORDER — GLYCOPYRROLATE 1 MG/5 ML
SYRINGE (ML) INTRAVENOUS PRN
Status: DISCONTINUED | OUTPATIENT
Start: 2019-07-02 | End: 2019-07-02 | Stop reason: SDUPTHER

## 2019-07-02 RX ORDER — SODIUM CHLORIDE 0.9 % (FLUSH) 0.9 %
10 SYRINGE (ML) INJECTION PRN
Status: DISCONTINUED | OUTPATIENT
Start: 2019-07-02 | End: 2019-07-02 | Stop reason: HOSPADM

## 2019-07-02 RX ORDER — FENTANYL CITRATE 50 UG/ML
INJECTION, SOLUTION INTRAMUSCULAR; INTRAVENOUS PRN
Status: DISCONTINUED | OUTPATIENT
Start: 2019-07-02 | End: 2019-07-02 | Stop reason: SDUPTHER

## 2019-07-02 RX ORDER — SODIUM CHLORIDE 9 MG/ML
INJECTION, SOLUTION INTRAVENOUS CONTINUOUS
Status: DISCONTINUED | OUTPATIENT
Start: 2019-07-02 | End: 2019-07-02 | Stop reason: HOSPADM

## 2019-07-02 RX ORDER — SODIUM CHLORIDE 0.9 % (FLUSH) 0.9 %
10 SYRINGE (ML) INJECTION EVERY 12 HOURS SCHEDULED
Status: DISCONTINUED | OUTPATIENT
Start: 2019-07-02 | End: 2019-07-02 | Stop reason: HOSPADM

## 2019-07-02 RX ORDER — SODIUM CHLORIDE 9 MG/ML
INJECTION, SOLUTION INTRAVENOUS CONTINUOUS PRN
Status: DISCONTINUED | OUTPATIENT
Start: 2019-07-02 | End: 2019-07-02 | Stop reason: SDUPTHER

## 2019-07-02 RX ORDER — GINSENG 100 MG
CAPSULE ORAL
Qty: 1 TUBE | Refills: 1 | Status: SHIPPED | OUTPATIENT
Start: 2019-07-02 | End: 2019-08-12

## 2019-07-02 RX ADMIN — Medication 0.1 MG: at 07:54

## 2019-07-02 RX ADMIN — PROPOFOL 50 MCG/KG/MIN: 10 INJECTION, EMULSION INTRAVENOUS at 07:35

## 2019-07-02 RX ADMIN — PHENYLEPHRINE HYDROCHLORIDE 100 MCG: 10 INJECTION INTRAVENOUS at 08:05

## 2019-07-02 RX ADMIN — FENTANYL CITRATE 25 MCG: 50 INJECTION, SOLUTION INTRAMUSCULAR; INTRAVENOUS at 07:45

## 2019-07-02 RX ADMIN — PHENYLEPHRINE HYDROCHLORIDE 100 MCG: 10 INJECTION INTRAVENOUS at 07:58

## 2019-07-02 RX ADMIN — Medication 2 G: at 07:35

## 2019-07-02 RX ADMIN — SODIUM CHLORIDE: 9 INJECTION, SOLUTION INTRAVENOUS at 05:45

## 2019-07-02 RX ADMIN — SODIUM CHLORIDE: 9 INJECTION, SOLUTION INTRAVENOUS at 07:10

## 2019-07-02 RX ADMIN — FENTANYL CITRATE 25 MCG: 50 INJECTION, SOLUTION INTRAMUSCULAR; INTRAVENOUS at 07:37

## 2019-07-02 RX ADMIN — PHENYLEPHRINE HYDROCHLORIDE 100 MCG: 10 INJECTION INTRAVENOUS at 08:11

## 2019-07-02 ASSESSMENT — PAIN SCALES - GENERAL
PAINLEVEL_OUTOF10: 0

## 2019-07-02 ASSESSMENT — PAIN SCALES - PAIN ASSESSMENT IN ADVANCED DEMENTIA (PAINAD)
BODYLANGUAGE: 0
TOTALSCORE: 0
BODYLANGUAGE: 0
FACIALEXPRESSION: 0
NEGVOCALIZATION: 0
TOTALSCORE: 0
BREATHING: 0
CONSOLABILITY: 0
CONSOLABILITY: 0
FACIALEXPRESSION: 0
CONSOLABILITY: 0
BREATHING: 0
TOTALSCORE: 0
FACIALEXPRESSION: 0
BODYLANGUAGE: 0
NEGVOCALIZATION: 0
NEGVOCALIZATION: 0
BREATHING: 0

## 2019-07-02 ASSESSMENT — PAIN - FUNCTIONAL ASSESSMENT: PAIN_FUNCTIONAL_ASSESSMENT: 0-10

## 2019-07-02 NOTE — ANESTHESIA POSTPROCEDURE EVALUATION
Department of Anesthesiology  Postprocedure Note    Patient: Mathieu Pelayo  MRN: [id number]  YOB: 1934  Date of evaluation: 7/2/2019  Time:  12:55 PM     Procedure Summary     Date:  07/02/19 Room / Location:  [facility] OR  / [facility] OR    Anesthesia Start:  0727 Anesthesia Stop:  0831    Procedure:  EXCISION OF BASAL CELL CARCINOMA RIGHT TEMPLE AND LEFT UPPER CHEST, LEFT ARM -- FROZEN (N/A Face) Diagnosis:  (MULTPLE AREAS OF BASAL CELL CARCINOMA)    Surgeon:  Romelia Moyer MD Responsible Provider:  Mark Anthony Tse MD    Anesthesia Type:  MAC ASA Status:  3          Anesthesia Type: MAC    Dinora Phase I: Dinora Score: 10    Dinora Phase II: Dinora Score: 10    Last vitals: Reviewed and per EMR flowsheets.        Anesthesia Post Evaluation    Patient location during evaluation: PACU  Patient participation: complete - patient participated  Level of consciousness: awake  Pain score: 1  Airway patency: patent  Nausea & Vomiting: no vomiting and no nausea  Complications: no  Cardiovascular status: hemodynamically stable  Respiratory status: acceptable  Hydration status: stable

## 2019-07-02 NOTE — PROGRESS NOTES
Admitted to Same Day Surgery. Preop instructions given to patient, wife, and daughter.
are to spend the night in the hospital.     PARKING INSTRUCTIONS:   [x] Arrival Time:___0530________  · [x] Parking lot '\"I\"  is located on [facility] [place] (the corner of [facility] and [facility] [place]). To enter, press the button and the gate will lift. A free token will be provided to exit the lot. One car per patient is allowed to park in this lot. All other cars are to park on [address] [place] [address] either in the parking garage or the handicap lot. [] To reach the [facility] lobby from [address] [place] [address], upon entering the hospital, take elevator B to the 3rd floor. EDUCATION INSTRUCTIONS:      [] Knee or hip replacement booklet & exercise pamphlets given. [] Minda Whiteside placed in chart. [] Pre-admission Testing educational folder given  [] Incentive Spirometry,coughing & deep breathing exercises reviewed. []Medication information sheet(s)   []Fluoroscopy-Xray used in surgery reviewed with patient. Educational pamphlet placed in chart. []Pain: Post-op pain is normal and to be expected. You will be asked to rate your pain from 0-10(a zero is not acceptable-education is needed). Your post-op pain goal is:  [] Ask your nurse for your pain medication. [] Joint camp offered. [] Joint replacement booklets given. [] Other:___________________________    MEDICATION INSTRUCTIONS:   [x]Bring a complete list of your medications, please write the last time you took the medicine, give this list to the nurse. [x] Take the following medications the morning of surgery with 1-2 ounces of water: SEE MED LIST  [] Stop herbal supplements and vitamins 5 days before your surgery. [] DO NOT take any diabetic medicine the morning of surgery. Follow instructions for insulin the day before surgery. [] If you are diabetic and your blood sugar is low or you feel symptomatic, you may drink 1-2 ounces of apple juice or take a glucose tablet.   The morning of your procedure, you may call the pre-op

## 2019-07-02 NOTE — OP NOTE
[address]                  [address] [place],  [facility]                                OPERATIVE REPORT    PATIENT NAME: Demarcus LEIVA                         :        1934  MED REC NO:   [id number]                            ROOM:  ACCOUNT NO:   [de-identified]                           ADMIT DATE: 2019  PROVIDER:     Lizeth Haider MD    DATE OF PROCEDURE:  2019    PREOPERATIVE DIAGNOSIS:  Basal cell cancer of right temple, left upper  chest, left arm. POSTOPERATIVE DIAGNOSIS:  Basal cell cancer of right temple, left upper  chest, left arm. NATURE OF OPERATION:  Excision of basal cell carcinoma of right temple,  left upper chest, left arm with rhomboid flap closure to right temple,  complex closure to the left chest and left arm. Lesions are as follows. The left arm lesion measures 15 x 12 mm, margins are 5 mm, defect is 25  x 22 mm, scar length is 80 mm. Left chest lesion measures 10 x 15 mm,  margins are 5 mm, defect is 20 x 25 mm, final scar length is 60 mm. Right temple lesion measures 12 x 14 mm, margins are 2 mm, defect 16 x  18 mm, flap is 20 x 15 mm and final scar length is 70 mm. ATTENDING SURGEON:  Lizeth Haider MD    ASSISTANT:  MILADIS Alexis. PA was required for the case due to  lack of adequately trained assistant, was involved in prepping, draping,  retracting, dressing, and suturing. ANESTHESIA:  Anesthesia was monitored anesthesia care. DRAINS:  No drains. EBL: See anesthesia note    SPECIMEN:  Basal cell cancer of right temple sent for frozen section  with representative margins reported negative for tumor and left upper  chest and left upper arm basal cell cancer sent for permanent pathology. No drains. No complications. PREOPREATIVE INDICATIONS:  The patient is an 17-year-old male with  history of skin cancers as described.   The patient and family elected to  have these excised in the operating room. Risks, benefits, and  alternatives were explained to the patient including bleeding, scarring,  infection, need for further surgery. They understood and elected to  proceed. He was seen on the day of surgery, marked and all questions  were answered. He was taken back to the operating room, placed in the  supine position. SCDs were on and functioning at the time of induction  of anesthesia. Preoperative antibiotics were given prior to incision. The area was prepped and draped in the usual sterile fashion with  Betadine paint. The areas were marked and measured and 2 mm margins  were taken around the temple and 5 mm margin taken around the arm and  the chest.  Elliptical excisions were designed on the arm and chest.   These were infiltrated with 1% lidocaine with 1:100,000 epinephrine and  allowed to work. A 15-blade was used to incise full thickness through  all lesions. They were sharply lifted off the underlying subcutaneous  tissue. The left chest and left arm were undermined at its lateral  edges and closed by complex means using 3-0 Monocryl deep dermal and 4-0  Monocryl running subcuticular sutures followed by Dermabond, 1 inch  Steri-Strips, and Tegaderm. The temple cannot be closed with complex  means as to raise the brow and create deformity, so a rhomboid flap was  chosen. The area was anesthetized. The rhomboid flap was created and  incised with a 15-blade. It was sharply lifted off the underlying  subcutaneous tissue and the surroundings were undermined. The rhomboid  was transposed into the defect and the defect created by the rhomboid  flap was closed primarily. Hemostasis was achieved prior to closure as  well as irrigation. The wound was closed with 5-0 Monocryl, deep  dermal, and 6-0 fast-absorbing gut running locking stitch. The patient  emerged from anesthesia without complication and taken to PACU in good  condition.         Chase Ramsey MD    D: 07/02/2019 8:55:14

## 2019-07-09 ENCOUNTER — OFFICE VISIT (OUTPATIENT)
Dept: SURGERY | Age: 84
End: 2019-07-09

## 2019-07-09 VITALS
DIASTOLIC BLOOD PRESSURE: 78 MMHG | HEART RATE: 78 BPM | BODY MASS INDEX: 23.62 KG/M2 | SYSTOLIC BLOOD PRESSURE: 122 MMHG | HEIGHT: 75 IN | RESPIRATION RATE: 16 BRPM | WEIGHT: 190 LBS | OXYGEN SATURATION: 98 % | TEMPERATURE: 98 F

## 2019-07-09 DIAGNOSIS — C44.91 BASAL CELL CARCINOMA (BCC), UNSPECIFIED SITE: Primary | ICD-10-CM

## 2019-07-09 PROCEDURE — 99024 POSTOP FOLLOW-UP VISIT: CPT | Performed by: PHYSICIAN ASSISTANT

## 2019-08-02 ENCOUNTER — TELEPHONE (OUTPATIENT)
Dept: GERIATRIC MEDICINE | Age: 84
End: 2019-08-02

## 2019-08-12 ENCOUNTER — OFFICE VISIT (OUTPATIENT)
Dept: GERIATRIC MEDICINE | Age: 84
End: 2019-08-12
Payer: MEDICARE

## 2019-08-12 VITALS
RESPIRATION RATE: 28 BRPM | HEART RATE: 64 BPM | DIASTOLIC BLOOD PRESSURE: 78 MMHG | HEIGHT: 74 IN | BODY MASS INDEX: 22.82 KG/M2 | SYSTOLIC BLOOD PRESSURE: 124 MMHG | WEIGHT: 177.8 LBS | TEMPERATURE: 97.6 F

## 2019-08-12 DIAGNOSIS — G30.0 EARLY ONSET ALZHEIMER'S DISEASE WITH BEHAVIORAL DISTURBANCE (HCC): Primary | ICD-10-CM

## 2019-08-12 DIAGNOSIS — F02.818 EARLY ONSET ALZHEIMER'S DISEASE WITH BEHAVIORAL DISTURBANCE (HCC): Primary | ICD-10-CM

## 2019-08-12 PROCEDURE — 99212 OFFICE O/P EST SF 10 MIN: CPT | Performed by: INTERNAL MEDICINE

## 2019-08-12 NOTE — PATIENT INSTRUCTIONS
that will allow you to sit while showering. · Get into a tub or shower by putting the weaker leg in first. Get out of a tub or shower with your strong side first.  · Repair loose toilet seats and consider installing a raised toilet seat to make getting on and off the toilet easier. · Keep your bathroom door unlocked while you are in the shower. Where can you learn more? Go to https://nediyor.compepiceweb.[email or web address] org and sign in to your [employer] account. Enter [id number] [id number] [phone] in the 5app box to learn more about \"Preventing Falls: Care Instructions. \"     If you do not have an account, please click on the \"Sign Up Now\" link. Current as of: November 7, 2018  Content Version: 12.1  © 4333-2446 [employer]wise, Incorporated. Care instructions adapted under license by [facility] ([facility]). If you have questions about a medical condition or this instruction, always ask your healthcare professional. [address] any warranty or liability for your use of this information.

## 2019-11-26 ENCOUNTER — OFFICE VISIT (OUTPATIENT)
Dept: GERIATRIC MEDICINE | Age: 84
End: 2019-11-26
Payer: MEDICARE

## 2019-11-26 VITALS
TEMPERATURE: 97.7 F | HEIGHT: 74 IN | WEIGHT: 175.6 LBS | SYSTOLIC BLOOD PRESSURE: 120 MMHG | HEART RATE: 64 BPM | DIASTOLIC BLOOD PRESSURE: 70 MMHG | BODY MASS INDEX: 22.54 KG/M2 | RESPIRATION RATE: 16 BRPM

## 2019-11-26 DIAGNOSIS — G30.1 LATE ONSET ALZHEIMER'S DISEASE WITHOUT BEHAVIORAL DISTURBANCE (HCC): Primary | ICD-10-CM

## 2019-11-26 DIAGNOSIS — F02.80 LATE ONSET ALZHEIMER'S DISEASE WITHOUT BEHAVIORAL DISTURBANCE (HCC): Primary | ICD-10-CM

## 2019-11-26 PROCEDURE — 99211 OFF/OP EST MAY X REQ PHY/QHP: CPT | Performed by: INTERNAL MEDICINE

## 2019-11-26 PROCEDURE — 99212 OFFICE O/P EST SF 10 MIN: CPT | Performed by: INTERNAL MEDICINE

## 2019-11-26 RX ORDER — SERTRALINE HYDROCHLORIDE 25 MG/1
25 TABLET, FILM COATED ORAL DAILY
Qty: 30 TABLET | Refills: 5 | Status: SHIPPED
Start: 2019-11-26 | End: 2020-05-15

## 2019-12-02 ENCOUNTER — TELEPHONE (OUTPATIENT)
Dept: GERIATRIC MEDICINE | Age: 84
End: 2019-12-02

## 2020-01-24 ENCOUNTER — TELEPHONE (OUTPATIENT)
Dept: GERIATRIC MEDICINE | Age: 85
End: 2020-01-24

## 2020-02-19 ENCOUNTER — TELEPHONE (OUTPATIENT)
Dept: GERIATRIC MEDICINE | Age: 85
End: 2020-02-19

## 2020-02-19 RX ORDER — TRAZODONE HYDROCHLORIDE 50 MG/1
50 TABLET ORAL NIGHTLY
Qty: 30 TABLET | Refills: 2 | Status: SHIPPED
Start: 2020-02-19 | End: 2020-05-22

## 2020-02-19 NOTE — TELEPHONE ENCOUNTER
Pt, wife, and daughter Larry Giles arrived at 1 pm today for OV appt. It had been cancelled as pt was here in November, and rescheduled for June. Family notified. Offered appt today if they would like. Declined. States they only wanted to ask Dr Moreira Borne 2 questions, and he could get back to them. 1)  They give pt Melatonin (3) 3 mg tabs (total of 9 mg) at HS. Sometimes he doesn't sleep. Wanted to know if DR would order something for sleep. 2)  Re: hypersexism, wife wanted to know if DR would increase the dose of Nuedexta. [facility] [place]. Daughter, Larry Giles -- [phone].

## 2020-02-19 NOTE — TELEPHONE ENCOUNTER
Daughter, Iggy Burrows notified that meds were ordered. Per Dr Claudia Ash, [facility] for pt to still use Melatonin. Iggy No notified.

## 2020-05-12 RX ORDER — MEMANTINE HYDROCHLORIDE 28 MG/1
28 CAPSULE, EXTENDED RELEASE ORAL NIGHTLY
Qty: 90 CAPSULE | Refills: 3 | Status: SHIPPED | OUTPATIENT
Start: 2020-05-12

## 2020-05-12 NOTE — TELEPHONE ENCOUNTER
Last Appointment:  11/26/2019  Future Appointments   Date Time Provider Gm Carmella   6/12/2020  1:00 PM Yvette Driver MD [facility]   9/23/2020 10:30 AM [facility] Isidro

## 2020-05-15 RX ORDER — SERTRALINE HYDROCHLORIDE 25 MG/1
TABLET, FILM COATED ORAL
Qty: 30 TABLET | Refills: 0 | Status: SHIPPED
Start: 2020-05-15 | End: 2020-07-02 | Stop reason: SDUPTHER

## 2020-05-21 NOTE — TELEPHONE ENCOUNTER
Last Appointment:  11/26/2019  Future Appointments   Date Time Provider Gm Weir   6/12/2020  1:00 PM Kelvin Carlin MD [facility]   9/23/2020 10:30 AM [facility] Ferrer

## 2020-05-22 RX ORDER — TRAZODONE HYDROCHLORIDE 50 MG/1
50 TABLET ORAL NIGHTLY
Qty: 30 TABLET | Refills: 0 | Status: SHIPPED
Start: 2020-05-22 | End: 2020-06-19 | Stop reason: SDUPTHER

## 2020-06-12 ENCOUNTER — OFFICE VISIT (OUTPATIENT)
Dept: GERIATRIC MEDICINE | Age: 85
End: 2020-06-12
Payer: MEDICARE

## 2020-06-12 VITALS
HEIGHT: 75 IN | DIASTOLIC BLOOD PRESSURE: 57 MMHG | SYSTOLIC BLOOD PRESSURE: 119 MMHG | WEIGHT: 162 LBS | BODY MASS INDEX: 20.14 KG/M2 | TEMPERATURE: 98 F | RESPIRATION RATE: 18 BRPM | OXYGEN SATURATION: 95 % | HEART RATE: 40 BPM

## 2020-06-12 PROCEDURE — 99212 OFFICE O/P EST SF 10 MIN: CPT | Performed by: INTERNAL MEDICINE

## 2020-06-15 DIAGNOSIS — R41.0 CONFUSION: ICD-10-CM

## 2020-06-15 DIAGNOSIS — R53.83 TIRED: ICD-10-CM

## 2020-06-15 DIAGNOSIS — R63.4 WEIGHT LOSS: Primary | ICD-10-CM

## 2020-06-19 RX ORDER — TRAZODONE HYDROCHLORIDE 50 MG/1
50 TABLET ORAL NIGHTLY
Qty: 30 TABLET | Refills: 0 | Status: SHIPPED | OUTPATIENT
Start: 2020-06-19

## 2020-06-22 ENCOUNTER — TELEPHONE (OUTPATIENT)
Dept: GERIATRIC MEDICINE | Age: 85
End: 2020-06-22

## 2020-06-22 NOTE — TELEPHONE ENCOUNTER
Daughter would like lab results -- recently ordered -- drawn at PCP's office, results in chart. Also, since last here on 6/12/20, pt is eating very little, daughter is worried he's getting dehydrated, drools, not really chewing food. Please call to discuss and advise.

## 2020-06-22 NOTE — TELEPHONE ENCOUNTER
Spoke with daughter and suggested that  their home nursing call me tomorrow  and I will order IV normal saline and Rocephin 1 Gm im x 3 days and if no better Hospice

## 2020-06-22 NOTE — TELEPHONE ENCOUNTER
Returned call to Anya Gonzalez at [facility]. States daughter had called & informed her that pt status is declining, wondering if Hospice is appropriate. Informed Anya Gonzalez that we see pt on consult and that Dr Ojeda Husbands is PCP, but will let Dr Jennifer Hammond know of daughter's inquiry.

## 2020-06-23 ENCOUNTER — TELEPHONE (OUTPATIENT)
Dept: GERIATRIC MEDICINE | Age: 85
End: 2020-06-23

## 2020-06-23 ENCOUNTER — HOSPITAL ENCOUNTER (EMERGENCY)
Age: 85
Discharge: HOME | End: 2020-06-23
Attending: EMERGENCY MEDICINE
Payer: MEDICARE

## 2020-06-23 VITALS
TEMPERATURE: 98.4 F | DIASTOLIC BLOOD PRESSURE: 75 MMHG | RESPIRATION RATE: 20 BRPM | WEIGHT: 162 LBS | BODY MASS INDEX: 20.14 KG/M2 | SYSTOLIC BLOOD PRESSURE: 149 MMHG | OXYGEN SATURATION: 97 % | HEART RATE: 59 BPM | HEIGHT: 75 IN

## 2020-06-23 LAB
ALBUMIN SERPL-MCNC: 3.5 G/DL (ref 3.5–5.2)
ALP BLD-CCNC: 133 U/L (ref 40–129)
ALT SERPL-CCNC: 8 U/L (ref 0–40)
AMORPHOUS: ABNORMAL
ANION GAP SERPL CALCULATED.3IONS-SCNC: 9 MMOL/L (ref 7–16)
AST SERPL-CCNC: 13 U/L (ref 0–39)
BACTERIA: ABNORMAL /HPF
BASOPHILS ABSOLUTE: 0.02 E9/L (ref 0–0.2)
BASOPHILS RELATIVE PERCENT: 0.3 % (ref 0–2)
BILIRUB SERPL-MCNC: 0.4 MG/DL (ref 0–1.2)
BILIRUBIN URINE: NEGATIVE
BLOOD, URINE: ABNORMAL
BUN BLDV-MCNC: 27 MG/DL (ref 8–23)
CALCIUM SERPL-MCNC: 9.2 MG/DL (ref 8.6–10.2)
CHLORIDE BLD-SCNC: 108 MMOL/L (ref 98–107)
CLARITY: ABNORMAL
CO2: 27 MMOL/L (ref 22–29)
COLOR: YELLOW
CREAT SERPL-MCNC: 1.2 MG/DL (ref 0.7–1.2)
EOSINOPHILS ABSOLUTE: 0.13 E9/L (ref 0.05–0.5)
EOSINOPHILS RELATIVE PERCENT: 2.2 % (ref 0–6)
EPITHELIAL CELLS, UA: ABNORMAL /HPF
GFR AFRICAN AMERICAN: >60
GFR NON-AFRICAN AMERICAN: 57 ML/MIN/1.73
GLUCOSE BLD-MCNC: 190 MG/DL (ref 74–99)
GLUCOSE URINE: NEGATIVE MG/DL
HCT VFR BLD CALC: 45 % (ref 37–54)
HEMOGLOBIN: 14.4 G/DL (ref 12.5–16.5)
IMMATURE GRANULOCYTES #: 0.01 E9/L
IMMATURE GRANULOCYTES %: 0.2 % (ref 0–5)
KETONES, URINE: NEGATIVE MG/DL
LEUKOCYTE ESTERASE, URINE: NEGATIVE
LYMPHOCYTES ABSOLUTE: 0.92 E9/L (ref 1.5–4)
LYMPHOCYTES RELATIVE PERCENT: 15.2 % (ref 20–42)
MCH RBC QN AUTO: 31.4 PG (ref 26–35)
MCHC RBC AUTO-ENTMCNC: 32 % (ref 32–34.5)
MCV RBC AUTO: 98.3 FL (ref 80–99.9)
MONOCYTES ABSOLUTE: 0.49 E9/L (ref 0.1–0.95)
MONOCYTES RELATIVE PERCENT: 8.1 % (ref 2–12)
MUCUS: PRESENT /LPF
NEUTROPHILS ABSOLUTE: 4.47 E9/L (ref 1.8–7.3)
NEUTROPHILS RELATIVE PERCENT: 74 % (ref 43–80)
NITRITE, URINE: NEGATIVE
PDW BLD-RTO: 13.2 FL (ref 11.5–15)
PH UA: 7 (ref 5–9)
PLATELET # BLD: 121 E9/L (ref 130–450)
PMV BLD AUTO: 11.2 FL (ref 7–12)
POTASSIUM REFLEX MAGNESIUM: 4.3 MMOL/L (ref 3.5–5)
PROTEIN UA: NEGATIVE MG/DL
RBC # BLD: 4.58 E12/L (ref 3.8–5.8)
RBC UA: ABNORMAL /HPF (ref 0–2)
SODIUM BLD-SCNC: 144 MMOL/L (ref 132–146)
SPECIFIC GRAVITY UA: 1.02 (ref 1–1.03)
TOTAL PROTEIN: 6.3 G/DL (ref 6.4–8.3)
UROBILINOGEN, URINE: 2 E.U./DL
WBC # BLD: 6 E9/L (ref 4.5–11.5)
WBC UA: ABNORMAL /HPF (ref 0–5)

## 2020-06-23 PROCEDURE — 99284 EMERGENCY DEPT VISIT MOD MDM: CPT

## 2020-06-23 PROCEDURE — 81001 URINALYSIS AUTO W/SCOPE: CPT

## 2020-06-23 PROCEDURE — 2580000003 HC RX 258: Performed by: EMERGENCY MEDICINE

## 2020-06-23 PROCEDURE — 96374 THER/PROPH/DIAG INJ IV PUSH: CPT

## 2020-06-23 PROCEDURE — 80053 COMPREHEN METABOLIC PANEL: CPT

## 2020-06-23 PROCEDURE — 6360000002 HC RX W HCPCS: Performed by: EMERGENCY MEDICINE

## 2020-06-23 PROCEDURE — 85025 COMPLETE CBC W/AUTO DIFF WBC: CPT

## 2020-06-23 RX ORDER — CEFDINIR 300 MG/1
300 CAPSULE ORAL 2 TIMES DAILY
Qty: 14 CAPSULE | Refills: 0 | Status: SHIPPED | OUTPATIENT
Start: 2020-06-23 | End: 2020-06-30

## 2020-06-23 RX ORDER — 0.9 % SODIUM CHLORIDE 0.9 %
1000 INTRAVENOUS SOLUTION INTRAVENOUS ONCE
Status: COMPLETED | OUTPATIENT
Start: 2020-06-23 | End: 2020-06-23

## 2020-06-23 RX ADMIN — WATER 1 G: 1 INJECTION INTRAMUSCULAR; INTRAVENOUS; SUBCUTANEOUS at 22:28

## 2020-06-23 RX ADMIN — SODIUM CHLORIDE 1000 ML: 9 INJECTION, SOLUTION INTRAVENOUS at 21:09

## 2020-06-24 NOTE — ED PROVIDER NOTES
Patient is a 51-year-old male past medical history of atrial fibrillation, dementia presents emergency department for possible UTI dehydration. Symptoms moderate in severity and constant since onset. Patient is nonverbal and history obtained by patient's daughter. They state over the last 6 months patient has had a 30 pound weight loss as well is a decreased appetite and not eating as much. They state he chews his food for for longer than he used to and will not eat as much food. They follow-up with his primary care provider last week and had lab work obtained which was unremarkable. They were concerned that this was a UTI and wanted patient to receive IV hydration and antibiotics with home health care. They received a call today and home health care does not have a nurse to send to their house so they had to come to the emergency department for evaluation. Daughter states they do not want patient hospitalized. All of the symptoms have been ongoing for the last couple months. There is no new or inciting events recently. Per daughter, she thinks patient's urine has smelled stronger in the last few weeks. They have tried adding up patient's food and the smaller chunks which does help him eat more. Cutting up his food make symptoms better, nothing make symptoms worse. Patient lives at home with his daughters and wife. 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 Review of Systems   Unable to perform ROS: Patient nonverbal   All other systems reviewed and are negative. Physical Exam  Vitals signs and nursing note reviewed. Constitutional:       General: He is not in acute distress. Appearance: He is well-developed. He is not ill-appearing. HENT:      Head: Normocephalic and atraumatic. Mouth/Throat:      Pharynx: Oropharynx is clear. Eyes:      Extraocular Movements: Extraocular movements intact. Pupils: Pupils are equal, round, and reactive to light.    Neck: 4.00 E9/L    Monocytes Absolute 0.49 0.10 - 0.95 E9/L    Eosinophils Absolute 0.13 0.05 - 0.50 E9/L    Basophils Absolute 0.02 0.00 - 0.20 E9/L   Comprehensive Metabolic Panel w/ Reflex to MG   Result Value Ref Range    Sodium 144 132 - 146 mmol/L    Potassium reflex Magnesium 4.3 3.5 - 5.0 mmol/L    Chloride 108 (H) 98 - 107 mmol/L    CO2 27 22 - 29 mmol/L    Anion Gap 9 7 - 16 mmol/L    Glucose 190 (H) 74 - 99 mg/dL    BUN 27 (H) 8 - 23 mg/dL    CREATININE 1.2 0.7 - 1.2 mg/dL    GFR Non-African American 57 >=60 mL/min/1.73    GFR African American >60     Calcium 9.2 8.6 - 10.2 mg/dL    Total Protein 6.3 (L) 6.4 - 8.3 g/dL    Alb 3.5 3.5 - 5.2 g/dL    Total Bilirubin 0.4 0.0 - 1.2 mg/dL    Alkaline Phosphatase 133 (H) 40 - 129 U/L    ALT 8 0 - 40 U/L    AST 13 0 - 39 U/L   Urinalysis, reflex to microscopic   Result Value Ref Range    Color, UA Yellow Straw/Yellow    Clarity, UA SL CLOUDY Clear    Glucose, Ur Negative Negative mg/dL    Bilirubin Urine Negative Negative    Ketones, Urine Negative Negative mg/dL    Specific Gravity, UA 1.020 1.005 - 1.030    Blood, Urine LARGE (A) Negative    pH, UA 7.0 5.0 - 9.0    Protein, UA Negative Negative mg/dL    Urobilinogen, Urine 2.0 (A) <2.0 E.U./dL    Nitrite, Urine Negative Negative    Leukocyte Esterase, Urine Negative Negative   Microscopic Urinalysis   Result Value Ref Range    Mucus, UA Present (A) None Seen /LPF    WBC, UA 0-1 0 - 5 /HPF    RBC, UA PACKED 0 - 2 /HPF    Epithelial Cells, UA RARE /HPF    Bacteria, UA MODERATE (A) None Seen /HPF    Amorphous, UA FEW        Radiology:  No orders to display     Consultation:  I Spoke with Dr. Chris Juarez (Medicine). Discussed case.   They state that the patient is okay to discharge home and will see this patient in follow-up.        ------------------------- NURSING NOTES AND VITALS REVIEWED ---------------------------  Date / Time Roomed:  6/23/2020  8:16 PM  ED Bed Assignment:  05/05    The nursing notes within the ED DO  Resident  06/24/20 [id number]

## 2020-07-02 RX ORDER — SERTRALINE HYDROCHLORIDE 25 MG/1
TABLET, FILM COATED ORAL
Qty: 30 TABLET | Refills: 5 | Status: SHIPPED | OUTPATIENT
Start: 2020-07-02
